# Patient Record
Sex: FEMALE | Race: BLACK OR AFRICAN AMERICAN
[De-identification: names, ages, dates, MRNs, and addresses within clinical notes are randomized per-mention and may not be internally consistent; named-entity substitution may affect disease eponyms.]

---

## 2017-08-02 ENCOUNTER — HOSPITAL ENCOUNTER (EMERGENCY)
Dept: HOSPITAL 62 - ER | Age: 32
Discharge: HOME | End: 2017-08-02
Payer: SELF-PAY

## 2017-08-02 VITALS — SYSTOLIC BLOOD PRESSURE: 125 MMHG | DIASTOLIC BLOOD PRESSURE: 84 MMHG
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
## 2017-08-02 DIAGNOSIS — Z23: ICD-10-CM
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
## 2017-08-02 DIAGNOSIS — S61.412A: Primary | ICD-10-CM

## 2017-08-02 PROCEDURE — 12001 RPR S/N/AX/GEN/TRNK 2.5CM/<: CPT

## 2017-08-02 PROCEDURE — 90715 TDAP VACCINE 7 YRS/> IM: CPT

## 2017-08-02 PROCEDURE — 0HQGXZZ REPAIR LEFT HAND SKIN, EXTERNAL APPROACH: ICD-10-PCS | Performed by: PHYSICIAN ASSISTANT

## 2017-08-02 PROCEDURE — 99283 EMERGENCY DEPT VISIT LOW MDM: CPT

## 2017-08-02 PROCEDURE — 90471 IMMUNIZATION ADMIN: CPT

## 2017-08-02 NOTE — ER DOCUMENT REPORT
HPI





- HPI


Pain Level: 5


Notes: 


Patient is a 32-year-old female presents the ED complaining of a laceration/

puncture wound to her left hand just prior to arrival.  Patient states that she 

was trying to separate frozen pieces of burger meat with a knife when he 

slipped off and punctured her left palm.  Patient states that it when he may be 

a 1/2 centimeter.  Patient states that the pain is a throbbing pain right now.  

Patient states she still able to  and she has feeling in her fingers.  

Patient states that she is able to stop the bleeding with compression.  Patient 

is not sure when she had a tetanus previously.  Denies any drug allergies, 

medications, significant past medical history.  She denies any smoking or 

illicit drug use.  Patient states the knife was otherwise clean.  Denies any 

headache, fever, chest pain, palpitation constantly, cough, wheeze, shortness 

breath, abdominal pain, nausea/vomiting/diarrhea, muscle weakness/paralysis, 

numbness/tingling.





- ROS


Notes: 


REVIEW OF SYSTEMS:


CONSTITUTIONAL :  Denies fever,  chills, or sweats.  Denies recent illness.


EENT:   Denies eye, ear, throat, or mouth pain or symptoms.  Denies nasal or 

sinus congestion or discharge.  Denies throat, tongue, or mouth swelling or 

difficulty swallowing.


CARDIOVASCULAR:  Denies chest pain.  Denies palpitations or racing or irregular 

heart beat.  Denies ankle edema.


RESPIRATORY:  Denies cough, cold, or chest congestion.  Denies shortness of 

breath, difficulty breathing, or wheezing.


GASTROINTESTINAL:  Denies abdominal pain or distention.  Denies nausea, vomiting

, or diarrhea.  Denies blood in vomitus, stools, or per rectum.  Denies black, 

tarry stools.  Denies constipation.  


GENITOURINARY:  Denies difficulty urinating, painful urination, burning, 

frequency, blood in urine, or discharge.


MUSCULOSKELETAL:  see hpi


SKIN:   see hpi


NEUROLOGICAL:  Denies confusion or altered mental status.  Denies passing out 

or loss of consciousness.  Denies dizziness or lightheadedness.  Denies 

headache.  Denies weakness or paralysis or loss of use of either side.  Denies 

problems with gait or speech.  Denies sensory loss, numbness, or tingling.  





ALL OTHER SYSTEMS REVIEWED AND NEGATIVE.





Dictation was performed using Dragon voice recognition software





Past Medical History





- Social History


Smoking Status: Never Smoker


Chew tobacco use (# tins/day): No


Frequency of alcohol use: None


Drug Abuse: None


Family History: Reviewed & Not Pertinent


Patient has suicidal ideation: No


Patient has homicidal ideation: No


Renal/ Medical History: Denies: Hx Peritoneal Dialysis


Past Surgical History: Reports: Hx  Section - x4





- Immunizations


Hx Diphtheria, Pertussis, Tetanus Vaccination: No





Vertical Provider Document





- CONSTITUTIONAL


Agree With Documented VS: Yes


Notes: 


PHYSICAL EXAMINATION:





GENERAL: Well-appearing, well-nourished and in no acute distress.





LUNGS: Breath sounds clear to auscultation bilaterally and equal.  No wheezes 

rales or rhonchi.





HEART: Regular rate and rhythm without murmurs, rubs, gallops.





Musculoskeletal: lt hand:  + 1.5cm linear laceration. FROM to passive/active of 

the fingers/hand/wrist. Strength 5+/5. Sensation intact distal.  No deficits 

noted.  No tendon/nervous/ligament compromise based on exam.  + tenderness to 

palp near laceration.





Extremities:  No cyanosis, clubbing, or edema b/l.  Peripheral pulses 2+.  

Capillary refill less than 2 seconds.





NEUROLOGICAL:Normal sensory, motor exams 





PSYCH: Normal mood, normal affect.





SKIN: Warm, Dry, normal turgor, no rashes or lesions noted.





- INFECTION CONTROL


TRAVEL OUTSIDE OF THE [place] IN LAST 30 DAYS: No





- RESPIRATORY


O2 Sat by Pulse Oximetry: 98





Course





- Re-evaluation


Re-evalutation: 





17 19:14


Patient is an afebrile, well-hydrated, 32-year-old female presents the ED with 

a laceration to her left anterior hand status post puncture wound with a knife.

  Vitals are stable.  PE otherwise unremarkable.  Tdap was given today.  There 

is no focal neurologicalm, vascular, musculoskeletalor deficit.  Wound was 

thoroughly cleansed and irrigated.  5 simple interrupted sutures were utilized 

to approximate the wound edges successfully without any complications.  Sterile 

wound dressing applied.  Wound instructions reviewed.  I will give her Keflex 3 

times daily 5 days as prophylactic.  Wound recheck in 2-3 days with PCM/ED.  

Call orthopedics to set up an appointment for further evaluation.  Return to 

the ED with any worsening/concerning symptoms otherwise as reviewed in 

discharge.  Patient is in agreement.





- Vital Signs


Vital signs: 


 











Temp Pulse Resp BP Pulse Ox


 


 98.9 F   70   20   125/84   98 


 


 17 16:39  17 16:39  17 16:39  17 16:39  17 16:39














Procedures





- Laceration/Wound Repair


  ** Left Anterior Hand


Time completed: 18:45


Wound length (cm): 1.5


Wound's Depth, Shape: Superficial, Linear


Laceration pre-procedure: Sterile PPE donned, Sterile drapes applied, Shur-

Clens applied


Anesthetic type: 1% Lidocaine


Volume Anesthetic (mLs): 7


Wound explored: Clean


Irrigated w/ Saline (mLs): 60


Wound Debrided: Minimal


Wound Repaired With: Sutures


Suture Size/Type: 5:0, Nylon


Number of Sutures: 5


Layer Closure?: No


Post-procedure wound care: Sterile dressing applied


Post-procedure NV exam normal: Yes


Complications: No





Discharge





- Discharge


Clinical Impression: 


Hand laceration


Qualifiers:


 Encounter type: initial encounter Foreign body presence: without foreign body 

Laterality: left Qualified Code(s): S61.412A - Laceration without foreign body 

of left hand, initial encounter





Condition: Stable


Disposition: HOME, SELF-CARE


Instructions:  Laceration Care ([facility]), Prophylactic Antibiotic ([facility]), Antibiotic 

Ointment Protection ([facility]), Soap Cleansing ([facility]), Tetanus Immunization Given ([facility]

)


Additional Instructions: 


Do not shower or bathe for 24 hours.  After 24 hours you may shower but no 

submersion of the wound under water.  Keep the original dressing on the wound 

for 24 hours unless the drainage soaks through.  Change the dressing daily 

thereafter and keep the knots of the suture material clean from any dried 

discharge.  You may leave the wound open to the air once there is no more 

discharge.  Return to the ED and/or your PCM in 2-3 days for a recheck.  

Monitor for any signs of worsening pain or redness, purulent drainage, streaks, 

and/or fever.  Return to the ED if noticing any of the above symptoms or as 

needed.  Take medications as directed.  Your sutures will need to be removed in 

12-14 days.





Call Orthopedics (hand specialist) for recheck and further evaluation as well.  


Prescriptions: 


Cephalexin Monohydrate [Keflex 500 mg Capsule] 500 mg PO TID #15 capsule


Referrals: 


[facility] DO [ACTIVE STAFF] - Follow up in 3-5 days

## 2017-11-30 NOTE — ER DOCUMENT REPORT
ED General





- General


Chief Complaint: Mouth Problem


Stated Complaint: MOUTH PAIN,HEADACHE


Time Seen by Provider: 17 00:48


Notes: 





Patient is a 32-year-old female without past history who presents with diffuse 

dental discomfort.  Patient does describe it as a diffuse, throbbing, moderate 

pain that is worsened by chewing or swallowing.  She states that this is been a 

problem for the past 2-3 weeks and she does have an appointment with the 

dentist in the next 24 hours.  She does deny any difficulty breathing.  No 

fever.  She notes that she feels her space might be somewhat swollen but has 

not noted any significant facial swelling.  She has also noted lymph node 

swelling.  The patient had multiple complaints in triage only complaint she 

does address with me her dental discomfort.


TRAVEL OUTSIDE OF THE [place] IN LAST 30 DAYS: No





- Related Data


Allergies/Adverse Reactions: 


 





No Known Allergies Allergy (Unverified 17 16:38)


 











Past Medical History





- General


Information source: Patient





- Social History


Smoking Status: Never Smoker


Frequency of alcohol use: None


Drug Abuse: None


Lives with: Spouse/Significant other


Family History: Reviewed & Not Pertinent


Patient has suicidal ideation: No


Patient has homicidal ideation: No


Renal/ Medical History: Denies: Hx Peritoneal Dialysis


Past Surgical History: Reports: Hx  Section - x4





- Immunizations


Hx Diphtheria, Pertussis, Tetanus Vaccination: No





Review of Systems





- Review of Systems


Notes: 





Constitutional: Negative for fever.


HENT: Positive for diffuse dental pain.


Eyes: Negative for visual changes.


Cardiovascular: Negative for chest pain.


Respiratory: Negative for shortness of breath.


Gastrointestinal: Negative for abdominal pain, vomiting or diarrhea.


Genitourinary: Negative for dysuria.


Musculoskeletal: Negative for back pain.


Skin: Negative for rash.


Neurological: Negative for headaches, weakness or numbness.





10 point ROS negative except as marked above and in HPI.





Physical Exam





- Vital signs


Vitals: 


 











Temp Pulse Resp BP Pulse Ox


 


 98.2 F   84   18   133/79 H  98 


 


 17 00:20  17 00:20  17 00:20  17 00:20  17 00:20











Interpretation: Normal


Notes: 





PHYSICAL EXAMINATION:





GENERAL: Well-appearing, well-nourished and in no acute distress.





HEAD: Atraumatic, normocephalic.





EYES: Pupils equal round and reactive to light, extraocular movements intact, 

sclera anicteric, conjunctiva are normal.





ENT: Multiple dental caries and several areas of chipped teeth.  There are 

multiple small periapical abscesses scattered throughout the gingiva.  Nares 

patent, oropharynx clear without exudates.  Moist mucous membranes.





NECK: Normal range of motion, bilateral submandibular lymphadenopathy.





LUNGS: Breath sounds clear to auscultation bilaterally and equal.  No wheezes 

rales or rhonchi.





HEART: Regular rate and rhythm without murmurs





ABDOMEN: Soft, nontender, normoactive bowel sounds.  No guarding, no rebound.  

No masses appreciated.





EXTREMITIES: Normal range of motion, no pitting or edema.  No cyanosis.





NEUROLOGICAL: No focal neurological deficits. Moves all extremities 

spontaneously and on command.





PSYCH: Normal mood, normal affect.





SKIN: Warm, Dry, normal turgor, no rashes or lesions noted.





Course





- Re-evaluation


Re-evalutation: 





17 01:13


Patient has diffuse dental caries and several small apical abscesses that 

expressed easily with direct palpation.  She has a scheduled appointment with a 

dentist within the next 24 hours.  Will start on clindamycin 3 times daily.  

Her airway is otherwise patent, no evidence of a peritonsillar abscess, Ad'

s angina, or significant facial swelling.  Vitals otherwise within normal 

limits.  At this time will discharge with return precautions and follow-up 

recommendations.  Verbal discharge instructions given a the bedside and 

opportunity for questions given. Medication warnings reviewed. Patient is in 

agreement with this plan and has verbalized understanding of return precautions 

and the need for him to follow-up as scheduled





- Vital Signs


Vital signs: 


 











Temp Pulse Resp BP Pulse Ox


 


 98.2 F   84   18   133/79 H  98 


 


 17 00:20  17 00:20  17 00:20  17 00:20  17 00:20














Discharge





- Discharge


Clinical Impression: 


 Pain, dental, Dental abscess





Condition: Good


Disposition: HOME, SELF-CARE


Additional Instructions: 


You have been seen for dental pain.  It is very important that you follow-up 

with a dentist for definitive care.  Please return if you develop fever greater 

than 101, swelling in your face, vomiting, difficulty breathing or swallowing, 

or any other symptoms that are concerning to you.  For pain you should take 

ibuprofen 600 mg every 6 hours as needed.


Prescriptions: 


Clindamycin HCl 300 mg PO TID #30 capsule


Referrals: 


OTONIEL MANDEL MD [Primary Care Provider] - Follow up as needed

## 2019-01-10 NOTE — ER DOCUMENT REPORT
ED Headache





- General


Chief Complaint: Headache


Stated Complaint: HEADACHE


Time Seen by Provider: 01/10/19 17:18


Mode of Arrival: Ambulatory


Information source: Patient


Notes: 





History of Present Illness








Chief Complaint: [headache]





[    33 years old female presents today with headache since this afternoon.  The

 headache is centered mostly in the occipital region.  Not associated with any n

ausea vomiting focal weakness numbness tingling sensation.


Denies any blurring of vision or aura.


Denies any fever chills or other constitutional symptoms]





History obtained from [patient]


Symptoms began: [today]


Onset: [gradual]


Timing: [constant]


Quality: ["pain"]


No different than prior severe headaches


Intensity: [severe, but not worst ever]


Location: [frontal]


Aggravating factors: [none]


Relieving factors: [none]





Denies neck pain or stiffness


Denies rash


Denies visual loss or eye pain.


Denies tick bite


Denies head injury


Denies weakness, numbness, incontinence, seizure, LOC





Review of systems : All other systems negative as reviewed.


CONSTITUTIONAL 


No fever.


EYES 


No eye pain. 


ENT 


No URI symptoms, No sore throat, No ear pain.


CARDIOVASCULAR 


No chest pain, No palpitations, No edema.


RESPIRATORY 


No Cough, No SOB, No wheezing. 


GASTROINTESTINAL 


No abdominal pain, No nausea, No vomiting, No diarrhea, No constipation, No 

melena, No rectal bleeding.


GENITOURINARY 


No UTI symptoms.


MUSCULOSKELETAL 


No back pain. 


SKIN 


No Rash.


NEUROLOGIC 


No paralysis, No parathesias. 


ENDOCRINE 


No polyuria. 


HEMO/LYMPATIC 


Patient does not bruise easily. 


PSYCHIATRIC 


No depression.





Physical Exam


CONSTITUTIONAL 


Vital signs reviewed, Comfortable, Alert and oriented X 3.


HEAD 


Atraumatic, Normal cephalic.


EYES 


No discharge from eye, Sclera are not injected, Extraocular muscles intact, 

Conjunctiva are normal.perrl,2mm, no photophobia, no nystagmus, fundi wnl.


ENT 


Ears normal to inspection, Nose examination normal, Oropharynx normal, Mucous 

membranes pink, moist, normal in color.


NECK 


Normal inspection, supple, Normal ROM, No jugular venous distention, No 

meningeal signs, no carotid bruit or tenderness.


RESPIRATORY/CHEST 


Chest is non-tender, Breath sounds normal, No respiratory distress.


CARDIOVASCULAR 


RRR, Heart sounds normal.


ABDOMEN 


Abdomen is non-tender, No masses, Bowel sounds normal, No distension, No 

peritoneal signs.


BACK 


Normal inspection.


UPPER EXTREMITY 


Inspection normal, No cyanosis/clubbing/edema.


LOWER EXTREMITY 


Inspection normal, No cyanosis/clubbing/edema, No calf tenderness.


NEURO 


cn intact, no astreixis, no pronator drift, finger to nose  testing coordinated 

 bilaterally, 1+ deep tendon reflexes x 4 ext, down going babinski bilaterally, 

normal speech, Motor exam normal, Sensory exam normal.


SKIN 


Skin is warm and dry, No rash.


LYMPHATIC 


No adenopathy in neck.


PSYCHIATRIC 


Normal affect.


TRAVEL OUTSIDE OF THE [place] IN LAST 30 DAYS: No





- HPI


Notes: 





Dictated





- Related Data


Allergies/Adverse Reactions: 


                                        





No Known Allergies Allergy (Verified 01/10/19 16:33)


   











Past Medical History





- Social History


Smoking Status: Never Smoker


Frequency of alcohol use: Rare


Drug Abuse: None


Lives with: Family


Family History: Reviewed & Not Pertinent





- Past Medical History


Cardiac Medical History: Reports: Hx Hypertension


Renal/ Medical History: Denies: Hx Peritoneal Dialysis


Past Surgical History: Reports: Hx  Section - x4





- Immunizations


Hx Diphtheria, Pertussis, Tetanus Vaccination: No





Review of Systems





- Review of Systems


Notes: 





Dictated





Physical Exam





- Vital signs


Vitals: 





                                        











Temp Pulse Resp BP Pulse Ox


 


 98.4 F   75   20   133/82 H  98 


 


 01/10/19 16:44  01/10/19 16:44  01/10/19 16:44  01/10/19 16:44  01/10/19 16:44














- Notes


Notes: 





Dictated





Course





- Re-evaluation


Re-evalutation: 





01/10/19 17:28


Given Toradol IM





- Vital Signs


Vital signs: 





                                        











Temp Pulse Resp BP Pulse Ox


 


 98.4 F   75   20   133/82 H  98 


 


 01/10/19 16:44  01/10/19 16:44  01/10/19 16:44  01/10/19 16:44  01/10/19 16:44














Discharge





- Discharge


Clinical Impression: 


Headache


Qualifiers:


 Headache type: unspecified Headache chronicity pattern: acute headache 

Intractability: not intractable Qualified Code(s): R51 - Headache





Condition: Fair


Disposition: HOME, SELF-CARE


Instructions:  Headache ([facility])


Prescriptions: 


Baclofen [Baclofen 10 mg Tablet] 10 mg PO TID #30 tab


Naproxen 500 mg PO BID #30 tablet


Referrals: 


OTONIEL MANDEL MD [Primary Care Provider] - Follow up as needed

## 2019-01-23 NOTE — ER DOCUMENT REPORT
ED General





- General


Chief Complaint: Vaginal Discharge


Stated Complaint: VAGINAL ISSUE


Time Seen by Provider: 19 22:19


Primary Care Provider: 


ANUJ ARNDT MD [Primary Care Provider] - Follow up as needed


Information source: Patient, [facility] Records


Notes: 





34-year-old female presents with complaint of vaginal itching, irritation for 1 

week.  Patient denies abdominal pain, vaginal discharge, dysuria, or STD.


TRAVEL OUTSIDE OF THE [place] IN LAST 30 DAYS: No





- HPI


Onset: Last week


Onset/Duration: Gradual, Persistent


Quality of pain: Burning, Other


Severity: Mild


Associated symptoms: None.  denies: Chest pain, Diarrhea, Fever, Nausea, 

Vomiting, Shortness of breath


Exacerbated by: Denies


Relieved by: Denies


Similar symptoms previously: Yes


Recently seen / treated by doctor: No





- Related Data


Allergies/Adverse Reactions: 


                                        





No Known Allergies Allergy (Verified 01/10/19 16:33)


   











Past Medical History





- General


Information source: Patient, [facility] Records





- Social History


Smoking Status: Never Smoker


Frequency of alcohol use: None


Drug Abuse: None


Lives with: Family


Family History: Reviewed & Not Pertinent


Patient has suicidal ideation: No


Patient has homicidal ideation: No





- Past Medical History


Cardiac Medical History: Reports: Hx Hypertension


Renal/ Medical History: Denies: Hx Peritoneal Dialysis


Past Surgical History: Reports: Hx  Section - x4





- Immunizations


Hx Diphtheria, Pertussis, Tetanus Vaccination: No





Review of Systems





- Review of Systems


Notes: 





REVIEW OF SYSTEMS:


CONSTITUTIONAL :  Denies fever,  chills, or sweats.  Denies recent illness. 

Denies weight loss, recent hospitalizations. 


EENT: Denies visual changes, eye pain.  Denies sore throat, oral lesions, 

difficulty swallowing.


CARDIOVASCULAR:  Denies chest pain.  Denies palpitations. Denies lower extremity

edema.


RESPIRATORY:  Denies cough. Denies shortness of breath, wheezing.


GASTROINTESTINAL:  Denies abdominal pain or distention.  Denies nausea, 

vomiting, or diarrhea.  Denies blood in vomitus, stools, or per rectum.  Denies 

black, tarry stools.  Denies constipation.  


GENITOURINARY:  Denies difficulty urinating, painful urination,  frequency, 

blood in urine, or  vaginal discharge.


MUSCULOSKELETAL:  Denies back or neck pain or stiffness.  Denies joint pain or 

swelling.


SKIN:   Denies rash, lesions or sores.


HEMATOLOGIC :   Denies easy bruising or bleeding.


LYMPHATIC:  Denies swollen glands.


NEUROLOGICAL:  Denies confusion or altered mental status.  Denies loss of 

consciousness.  Denies dizziness or lightheadedness.  Denies headache.  Denies 

weakness or paralysis.  Denies problems difficulty with ambulation, slurred 

speech.  Denies sensory loss, numbness, or tingling.  Denies seizures.


PSYCHIATRIC:  Denies anxiety or stress.  Denies depression, suicidal ideation, 

or homicidal ideation.  Denies visual or auditory hallucinations.








Physical Exam





- Vital signs


Vitals: 


                                        











Temp Pulse Resp BP Pulse Ox


 


 98.4 F   83   20   131/85 H  99 


 


 19 20:27  19 20:27  19 20:27  19 20:27  19 20:27














- Notes


Notes: 





PHYSICAL EXAMINATION:





GENERAL: Well-appearing, well-nourished and in no acute distress.





HEAD: Atraumatic, normocephalic.





EYES: Pupils equal round and reactive to light, extraocular movements intact, 

conjunctiva are normal.





ENT: Nares patent, oropharynx clear without exudates.  Moist mucous membranes.





NECK: Normal range of motion, supple without lymphadenopathy





LUNGS: Breath sounds clear to auscultation bilaterally and equal.  No wheezes 

rales or rhonchi.





HEART: Regular rate and rhythm without murmurs





ABDOMEN: Soft, nontender, nondistended abdomen.  No guarding, no rebound.  No 

masses appreciated.





Female : Pelvic exam; External genitalia unremarkable.  Speculum exam with 

thick white discharge.  Vaginal wall unremarkable.  Os closed.  No cervical 

motion tenderness.  No adnexal tenderness or masses appreciated.  Swabs obtained

for gonorrhea, chlamydia and wet prep.





Musculoskeletal: Normal range of motion, no pitting or edema.  No cyanosis.





NEUROLOGICAL: Cranial nerves grossly intact.  Normal speech, normal gait.  

Normal sensory, motor exams





PSYCH: Normal mood, normal affect.





SKIN: Warm, Dry, normal turgor, no rashes or lesions noted.  





Course





- Re-evaluation


Re-evalutation: 





19 19:51





Laboratory











  19





  22:15 22:55 23:00


 


Urine Color   YELLOW 


 


Urine Appearance   SLIGHTLY-CLOUDY 


 


Urine pH   5.0 


 


Ur Specific Gravity   1.018 


 


Urine Protein   NEGATIVE 


 


Urine Glucose (UA)   NEGATIVE 


 


Urine Ketones   NEGATIVE 


 


Urine Blood   NEGATIVE 


 


Urine Nitrite   NEGATIVE 


 


Urine Bilirubin   NEGATIVE 


 


Urine Urobilinogen   2.0 H 


 


Ur Leukocyte Esterase   LARGE H 


 


Urine WBC (Auto)   2 


 


Urine RBC (Auto)   1 


 


Squamous Epi Cells Auto   6 


 


Urine Mucus (Auto)   OCC 


 


Urine Ascorbic Acid   NEGATIVE 


 


Urine HCG, Qual  NEGATIVE  


 


Epi Cells (Wet Prep)   


 


Trichomonas (Wet Prep)   


 


Vaginal WBC   


 


Vaginal Yeast   


 


Chlamydia DNA (PCR)    NOT DETECTED


 


N.gonorrhoeae DNA (PCR)    NOT DETECTED














  19





  23:00


 


Urine Color 


 


Urine Appearance 


 


Urine pH 


 


Ur Specific Gravity 


 


Urine Protein 


 


Urine Glucose (UA) 


 


Urine Ketones 


 


Urine Blood 


 


Urine Nitrite 


 


Urine Bilirubin 


 


Urine Urobilinogen 


 


Ur Leukocyte Esterase 


 


Urine WBC (Auto) 


 


Urine RBC (Auto) 


 


Squamous Epi Cells Auto 


 


Urine Mucus (Auto) 


 


Urine Ascorbic Acid 


 


Urine HCG, Qual 


 


Epi Cells (Wet Prep)  3+ EPITHELIALS SEEN


 


Trichomonas (Wet Prep)  NO TRICHOMONAS SEEN


 


Vaginal WBC  NO WBCS SEEN


 


Vaginal Yeast  NO YEAST SEEN


 


Chlamydia DNA (PCR) 


 


N.gonorrhoeae DNA (PCR) 











                                        











Temp Pulse Resp BP Pulse Ox


 


 98.2 F   82   16   146/93 H  99 


 


 19 23:23  19 23:23  19 23:23  19 23:23  19 23:23








32-year-old female presents with vaginal itching.  Vital signs stable upon ar

rival.  Patient has a benign abdominal exam and pelvic exam significant for 

thick white discharge.  Wet mount negative for bacterial vaginosis, trichomonas.

 Gonorrhea  and chlamydia negative.  Exam consistent with vaginitis.  Patient 

was evaluated and treated as appropriate for the patient's presenting symptoms 

and complaint, with consideration of any critical or life threatening conditions

that may be associated with their obtained history and exam as noted above. All 

results were discussed with patient . Patient provided the opportunity to ask 

questions, and express concerns. Patient was educated on treatments based on 

their presumed diagnosis as noted above.  At this time we will discharge the 

patient with return precautions and follow-up recommendations.  Verbal discharge

instructions given a the bedside. Medication warnings reviewed. Patient is in 

agreement with this plan and has verbalized understanding of return precautions.







After careful consideration I feel that that patient can be safely discharged 

from the emergency department,  they were advised to followup with a primary 

care physician in 2-3 days. 








Dictation on this chart was performed using voice recognition software and may 

result in unintended grammatical, spelling, syntax or errors.














19 19:52








- Vital Signs


Vital signs: 


                                        











Temp Pulse Resp BP Pulse Ox


 


 98.2 F   82   16   146/93 H  99 


 


 19 23:23  19 23:23  19 23:23  19 23:23  19 23:23














- Laboratory


Laboratory results interpreted by me: 


                                        











  19





  22:55


 


Urine Urobilinogen  2.0 H


 


Ur Leukocyte Esterase  LARGE H














Discharge





- Discharge


Clinical Impression: 


 Vaginal itching





Condition: Good


Disposition: HOME, SELF-CARE


Instructions:  Vaginitis ([facility])


Prescriptions: 


Miconazole Nitrate [Monistat 7] 45 gm VG BID 5 Days #1 [email or web address]


Referrals: 


ANUJ ARNDT MD [Primary Care Provider] - Follow up as needed

## 2019-04-12 NOTE — RADIOLOGY REPORT (SQ)
EXAM DESCRIPTION:

XR FOOT 3 OR MORE VIEWS



COMPLETED DATE/TME:  04/12/2019 22:44



CLINICAL HISTORY:

34 years Female, also foot pain, limping



COMPARISON:

None.



Findings:



Moderate diffuse swelling of the left hindfoot, plantar aspect,

measures 2.7-cm in thickness.  Bones, joints, and soft tissues of

the LEFT XR FOOT 3 OR MORE VIEWS appear otherwise unremarkable.



IMPRESSION:



Swelling includes thickened heel pad of the left foot.

Differential etiologies include cellulitis, generalized edema,

trauma, acromegaly, complications of Dilantin, and chronic

plantar fasciitis.

## 2019-04-12 NOTE — RADIOLOGY REPORT (SQ)
EXAM DESCRIPTION: 



XR LEFT ANKLE 3 OR MORE VIEWS



COMPLETED DATE/TME:  04/12/2019 00:00



CLINICAL HISTORY: 



34 years, Female, injury 



COMPARISON:

None.



NUMBER OF VIEWS:





TECHNIQUE:





LIMITATIONS:

None.



FINDINGS:



No fracture or dislocation.



There is medial soft tissue swelling.



Mineralization of bone appears normal.



IMPRESSION:



Medial soft tissue swelling.

 



copyright 2011 [employer]- All Rights Reserved

## 2019-04-12 NOTE — ER DOCUMENT REPORT
ED Medical Screen (RME)





- General


Chief Complaint: Ankle Injury


Stated Complaint: FOOT PAIN


Time Seen by Provider: 19 22:35


Primary Care Provider: 


ANUJ ARNDT MD [Primary Care Provider] - Follow up as needed


Notes: 





34-year-old female, chief complaint of left foot and ankle pain.  States that 

she had an inversion injury of the ankle almost 1 year ago, has had intermittent

problems since, typically walks awkwardly and limps on the foot, currently has 

swelling and pain.  States she never had x-rays or evaluation for it.


TRAVEL OUTSIDE OF THE [place] IN LAST 30 DAYS: No





- Related Data


Allergies/Adverse Reactions: 


                                        





No Known Allergies Allergy (Verified 01/10/19 16:33)


   











Past Medical History





- Past Medical History


Cardiac Medical History: Reports: Hx Hypertension


Renal/ Medical History: Denies: Hx Peritoneal Dialysis


Past Surgical History: Reports: Hx  Section - x4





- Immunizations


Hx Diphtheria, Pertussis, Tetanus Vaccination: No





Physical Exam





- Vital signs


Vitals: 





                                        











Temp Pulse Resp BP Pulse Ox


 


 99.3 F   88   20   142/79 H  96 


 


 19 22:19  19 22:19  19 22:19  19 22:19  19 22:19














- Extremities


General lower extremity: Other - Pain with palpation over the left foot and 

ankle generally, soft tissue swelling noted around the ankle which is mild, no 

bruising, no abnormal erythema, normal capillary refill and sensation.





Course





- Re-evaluation


Re-evalutation: 


Painful over the foot and ankle, already had ankle x-rays here, requesting for 

x-rays.  This was ordered.





I have greeted and performed a rapid initial assessment of this patient.  A 

comprehensive ED assessment and evaluation of the patient, analysis of test 

results and completion of the medical decision making process will be conducted 

by additional ED providers.





- Vital Signs


Vital signs: 





                                        











Temp Pulse Resp BP Pulse Ox


 


 99.3 F   88   20   142/79 H  96 


 


 19 22:19  19 22:19  19 22:19  19 22:19  19 22:19














Doctor's Discharge





- Discharge


Referrals: 


ANUJ ARNDT MD [Primary Care Provider] - Follow up as needed

## 2019-04-13 NOTE — ER DOCUMENT REPORT
Entered by BRIAN QUIROZ SCRIBE  19 0047 





Acting as scribe for:GERARDO CHAVEZ DO





ED Extremity Problem, Lower





- General


Chief Complaint: Ankle Injury


Stated Complaint: FOOT PAIN


Time Seen by Provider: 19 22:35


Primary Care Provider: 


ANUJ ARNDT MD [Primary Care Provider] - Follow up as needed


SHO FRANKLIN MD [ACTIVE STAFF] - Follow up as needed


Mode of Arrival: Ambulatory


Notes: 





34-year-old female who presents to the emergency department today with 

complaints of left ankle pain.  Patient states that she twisted her ankle over a

year ago and has had constant pain with this ankle since that injury.  Patient 

denies any new injury.  Patient is able to ambulate with some pain.


TRAVEL OUTSIDE OF THE [place] IN LAST 30 DAYS: No





- Related Data


Allergies/Adverse Reactions: 


                                        





No Known Allergies Allergy (Verified 01/10/19 16:33)


   











Past Medical History





- General


Information source: Patient





- Social History


Smoking Status: Unknown if Ever Smoked


Cigarette use (# per day): No


Frequency of alcohol use: None


Drug Abuse: None


Lives with: Family


Family History: Reviewed & Not Pertinent


Patient has suicidal ideation: No


Patient has homicidal ideation: No





- Past Medical History


Cardiac Medical History: Reports: Hx Hypertension


Past Surgical History: Reports: Hx  Section - x4





- Immunizations


Hx Diphtheria, Pertussis, Tetanus Vaccination: No





Review of Systems





- Review of Systems


Constitutional: No symptoms reported


EENT: No symptoms reported


Cardiovascular: No symptoms reported


Respiratory: No symptoms reported


Gastrointestinal: No symptoms reported


Genitourinary: No symptoms reported


Female Genitourinary: No symptoms reported


Musculoskeletal: See HPI, Other - left ankle pain


Skin: No symptoms reported


Hematologic/Lymphatic: No symptoms reported


Neurological/Psychological: No symptoms reported


-: Yes All other systems reviewed and negative





Physical Exam





- Vital signs


Vitals: 


                                        











Temp Pulse Resp BP Pulse Ox


 


 99.3 F   88   20   142/79 H  96 


 


 19 22:19  19 22:19  19 22:19  19 22:19  19 22:19














- Notes


Notes: 





PHYSICAL EXAM


 


GENERAL: Alert, interacts well. No acute distress.


HEAD: Normocephalic, atraumatic.


EYES: Pupils equal, round, and reactive to light. Extraocular movements intact.


ENT: Oral mucosa moist, tongue midline. 


NECK: Full range of motion. Supple. Trachea midline.


LUNGS: No respiratory distress.


EXTREMITIES: Moves all 4 extremities spontaneously. Swelling over the left 

medial foot, over the instep. No fluctuance or erythema. Tenderness with 

palpation of distal medial malleolus. 


NEUROLOGICAL: Alert and oriented x3. Normal speech. 


PSYCH: Normal affect, normal mood.


SKIN: Warm, dry, normal turgor. No rashes or lesions noted.








Course





- Re-evaluation


Re-evalutation: 





19 01:00


ankle and foot x-rays negative for acute fracture.  No tenderness palpation over

the heel, low suspicion for cellulitis or plantar fasciitis as she has no pain 

in those areas despite the reading on the x-ray.  Encourage patient to use an 

Ace wrap for comfort and support and follow-up with orthopedic surgery as an 

outpatient for this left ankle pain and subjective instability this been going 

on for the past year.  Patient agreeable to this plan.  Discharged home.





- Vital Signs


Vital signs: 


                                        











Temp Pulse Resp BP Pulse Ox


 


 98.2 F   85   17   139/82 H  98 


 


 19 01:20  19 01:20  19 01:20  19 01:20  19 01:20














Discharge





- Discharge


Clinical Impression: 


 Chronic pain of left ankle





Condition: Stable


Disposition: HOME, SELF-CARE


Additional Instructions: 


Use Ace wrap to decrease the pain in your left ankle.  Wear supportive shoes.  

Take ibuprofen and acetaminophen as directed on the bottle over-the-counter as 

needed for pain.  Follow-up with orthopedic surgery as an outpatient.


Referrals: 


ANUJ ARNDT MD [Primary Care Provider] - Follow up as needed


SHO FRANKLIN MD [ACTIVE STAFF] - Follow up as needed





I personally performed the services described in the documentation, reviewed and

edited the documentation which was dictated to the scribe in my presence, and it

accurately records my words and actions.